# Patient Record
Sex: FEMALE | Race: WHITE | Employment: OTHER | URBAN - METROPOLITAN AREA
[De-identification: names, ages, dates, MRNs, and addresses within clinical notes are randomized per-mention and may not be internally consistent; named-entity substitution may affect disease eponyms.]

---

## 2017-04-06 ENCOUNTER — HOSPITAL ENCOUNTER (OUTPATIENT)
Dept: LAB | Age: 62
Discharge: HOME | End: 2017-04-06

## 2017-04-06 PROCEDURE — 88305 TISSUE EXAM BY PATHOLOGIST: CPT | Performed by: INTERNAL MEDICINE

## 2017-11-29 ENCOUNTER — HOSPITAL ENCOUNTER (OUTPATIENT)
Dept: MAMMOGRAPHY | Age: 62
Discharge: HOME | End: 2017-11-29
Attending: OBSTETRICS & GYNECOLOGY
Payer: COMMERCIAL

## 2017-11-29 DIAGNOSIS — Z12.31 VISIT FOR SCREENING MAMMOGRAM: ICD-10-CM

## 2017-11-29 PROCEDURE — 77067 SCR MAMMO BI INCL CAD: CPT

## 2018-11-30 ENCOUNTER — HOSPITAL ENCOUNTER (OUTPATIENT)
Dept: MAMMOGRAPHY | Age: 63
Discharge: HOME | End: 2018-11-30
Attending: INTERNAL MEDICINE
Payer: MEDICARE

## 2018-11-30 DIAGNOSIS — Z12.31 VISIT FOR SCREENING MAMMOGRAM: ICD-10-CM

## 2018-11-30 PROCEDURE — 77067 SCR MAMMO BI INCL CAD: CPT

## 2020-02-06 ENCOUNTER — HOSPITAL ENCOUNTER (OUTPATIENT)
Dept: MAMMOGRAPHY | Age: 65
Discharge: HOME | End: 2020-02-06
Attending: INTERNAL MEDICINE
Payer: MEDICARE

## 2020-02-06 DIAGNOSIS — Z12.31 VISIT FOR SCREENING MAMMOGRAM: ICD-10-CM

## 2020-02-06 PROCEDURE — 77067 SCR MAMMO BI INCL CAD: CPT

## 2021-09-25 ENCOUNTER — HOSPITAL ENCOUNTER (OUTPATIENT)
Dept: LAB | Age: 66
Discharge: HOME | End: 2021-09-25

## 2021-09-25 LAB
ANION GAP SERPL CALC-SCNC: 8 MMOL/L (ref 7–16)
BUN SERPL-MCNC: 42 MG/DL (ref 8–23)
CALCIUM SERPL-MCNC: 8.8 MG/DL (ref 8.3–10.4)
CHLORIDE SERPL-SCNC: 110 MMOL/L (ref 98–107)
CO2 SERPL-SCNC: 24 MMOL/L (ref 21–32)
CREAT SERPL-MCNC: 1.61 MG/DL (ref 0.6–1)
GLUCOSE SERPL-MCNC: 76 MG/DL (ref 65–100)
POTASSIUM SERPL-SCNC: 4.7 MMOL/L (ref 3.5–5.1)
SODIUM SERPL-SCNC: 142 MMOL/L (ref 136–145)
VALPROATE SERPL-MCNC: 4 UG/ML (ref 50–100)

## 2021-09-25 PROCEDURE — 80048 BASIC METABOLIC PNL TOTAL CA: CPT

## 2021-09-25 PROCEDURE — 80164 ASSAY DIPROPYLACETIC ACD TOT: CPT

## 2022-02-25 PROBLEM — M25.512 LEFT SHOULDER PAIN: Status: ACTIVE | Noted: 2022-02-25

## 2022-03-19 PROBLEM — M25.512 LEFT SHOULDER PAIN: Status: ACTIVE | Noted: 2022-02-25

## 2022-04-13 PROBLEM — G72.81 CRITICAL ILLNESS MYOPATHY: Status: ACTIVE | Noted: 2021-08-26

## 2022-04-13 PROBLEM — R41.82 ALTERED MENTAL STATUS: Status: ACTIVE | Noted: 2021-07-25

## 2022-04-13 PROBLEM — D69.3 ACUTE ITP (HCC): Status: ACTIVE | Noted: 2019-02-01

## 2022-04-13 PROBLEM — R20.2 HEMIPARESTHESIA: Status: ACTIVE | Noted: 2021-02-22

## 2022-04-13 PROBLEM — J96.90 RESPIRATORY FAILURE (HCC): Status: ACTIVE | Noted: 2021-08-26

## 2022-04-13 PROBLEM — Z94.0 KIDNEY TRANSPLANTED: Status: ACTIVE | Noted: 2019-06-24

## 2022-04-13 PROBLEM — G81.94 LEFT HEMIPARESIS (HCC): Status: ACTIVE | Noted: 2021-04-02

## 2022-04-13 PROBLEM — R06.03 ACUTE RESPIRATORY DISTRESS: Status: ACTIVE | Noted: 2021-07-28

## 2022-04-13 PROBLEM — I95.9 HYPOTENSION: Status: ACTIVE | Noted: 2021-03-22

## 2022-04-13 PROBLEM — I10 HYPERTENSION: Status: ACTIVE | Noted: 2021-08-26

## 2022-04-13 PROBLEM — N17.9 AKI (ACUTE KIDNEY INJURY) (HCC): Status: ACTIVE | Noted: 2021-08-26

## 2022-04-13 PROBLEM — G93.6 VASOGENIC EDEMA (HCC): Status: ACTIVE | Noted: 2021-02-22

## 2022-04-13 PROBLEM — D69.6 THROMBOCYTOPENIA (HCC): Status: ACTIVE | Noted: 2019-09-24

## 2022-04-13 PROBLEM — E87.20 METABOLIC ACIDOSIS: Status: ACTIVE | Noted: 2021-07-28

## 2022-04-13 PROBLEM — I48.0 PAROXYSMAL ATRIAL FIBRILLATION (HCC): Status: ACTIVE | Noted: 2022-01-06

## 2022-04-13 PROBLEM — E87.0 HYPERNATREMIA: Status: ACTIVE | Noted: 2021-07-28

## 2022-04-13 PROBLEM — D84.9 IMMUNOSUPPRESSED STATUS (HCC): Status: ACTIVE | Noted: 2021-02-22

## 2022-04-13 PROBLEM — L89.150 PRESSURE INJURY OF SACRAL REGION, UNSTAGEABLE (HCC): Status: ACTIVE | Noted: 2021-08-26

## 2022-04-13 PROBLEM — J96.01 ACUTE RESPIRATORY FAILURE WITH HYPOXIA (HCC): Status: ACTIVE | Noted: 2021-08-26

## 2022-04-13 PROBLEM — E11.42 DIABETIC POLYNEUROPATHY ASSOCIATED WITH TYPE 2 DIABETES MELLITUS (HCC): Status: ACTIVE | Noted: 2021-03-05

## 2022-04-13 PROBLEM — R53.81 DEBILITY: Status: ACTIVE | Noted: 2021-08-26

## 2022-04-13 PROBLEM — B45.1 CRYPTOCOCCAL MENINGITIS (HCC): Status: ACTIVE | Noted: 2021-03-04

## 2022-04-13 PROBLEM — N18.32 STAGE 3B CHRONIC KIDNEY DISEASE (HCC): Status: ACTIVE | Noted: 2021-08-26

## 2022-04-13 PROBLEM — D64.9 ANEMIA: Status: ACTIVE | Noted: 2021-01-05

## 2023-02-15 ENCOUNTER — HOSPITAL ENCOUNTER (OUTPATIENT)
Dept: MAMMOGRAPHY | Age: 68
Discharge: HOME | End: 2023-02-18
Payer: MEDICARE

## 2023-02-15 DIAGNOSIS — Z12.31 VISIT FOR SCREENING MAMMOGRAM: ICD-10-CM

## 2023-02-15 PROCEDURE — 77067 SCR MAMMO BI INCL CAD: CPT

## 2024-01-22 ENCOUNTER — TRANSCRIBE ORDERS (OUTPATIENT)
Dept: SCHEDULING | Age: 69
End: 2024-01-22

## 2024-01-22 DIAGNOSIS — Z12.31 ENCOUNTER FOR SCREENING MAMMOGRAM FOR BREAST CANCER: Primary | ICD-10-CM

## 2024-05-22 ENCOUNTER — OFFICE VISIT (OUTPATIENT)
Dept: OBGYN CLINIC | Age: 69
End: 2024-05-22
Payer: MEDICARE

## 2024-05-22 VITALS — DIASTOLIC BLOOD PRESSURE: 64 MMHG | WEIGHT: 179.4 LBS | SYSTOLIC BLOOD PRESSURE: 116 MMHG

## 2024-05-22 DIAGNOSIS — Z12.31 VISIT FOR SCREENING MAMMOGRAM: ICD-10-CM

## 2024-05-22 DIAGNOSIS — Z01.419 WELL WOMAN EXAM: Primary | ICD-10-CM

## 2024-05-22 PROCEDURE — G8427 DOCREV CUR MEDS BY ELIG CLIN: HCPCS | Performed by: NURSE PRACTITIONER

## 2024-05-22 PROCEDURE — G8421 BMI NOT CALCULATED: HCPCS | Performed by: NURSE PRACTITIONER

## 2024-05-22 PROCEDURE — G0101 CA SCREEN;PELVIC/BREAST EXAM: HCPCS | Performed by: NURSE PRACTITIONER

## 2024-05-22 RX ORDER — FUROSEMIDE 80 MG
80 TABLET ORAL DAILY
COMMUNITY

## 2024-05-22 RX ORDER — LISINOPRIL 20 MG/1
20 TABLET ORAL DAILY
COMMUNITY

## 2024-05-22 RX ORDER — SODIUM BICARBONATE 650 MG/1
650 TABLET ORAL 2 TIMES DAILY
COMMUNITY

## 2024-05-22 RX ORDER — FLUCONAZOLE 100 MG/1
100 TABLET ORAL DAILY
COMMUNITY

## 2024-05-22 RX ORDER — LEVOTHYROXINE SODIUM 0.03 MG/1
25 TABLET ORAL DAILY
COMMUNITY

## 2024-05-22 RX ORDER — MONTELUKAST SODIUM 4 MG/1
1 TABLET, CHEWABLE ORAL DAILY
COMMUNITY

## 2024-05-22 RX ORDER — AZATHIOPRINE 50 MG/1
75 TABLET ORAL DAILY
COMMUNITY

## 2024-05-22 RX ORDER — METOPROLOL SUCCINATE 50 MG/1
50 TABLET, EXTENDED RELEASE ORAL DAILY
COMMUNITY

## 2024-05-22 RX ORDER — AMIODARONE HYDROCHLORIDE 200 MG/1
100 TABLET ORAL DAILY
COMMUNITY

## 2024-05-22 RX ORDER — PRIMIDONE 50 MG/1
50 TABLET ORAL 3 TIMES DAILY
COMMUNITY

## 2024-05-22 RX ORDER — DOXAZOSIN MESYLATE 4 MG/1
4 TABLET ORAL NIGHTLY
COMMUNITY

## 2024-05-22 RX ORDER — PRAMIPEXOLE DIHYDROCHLORIDE 0.25 MG/1
0.25 TABLET ORAL DAILY
COMMUNITY

## 2024-05-22 RX ORDER — INSULIN GLARGINE 100 [IU]/ML
INJECTION, SOLUTION SUBCUTANEOUS NIGHTLY
COMMUNITY

## 2024-05-22 RX ORDER — LANOLIN ALCOHOL/MO/W.PET/CERES
800 CREAM (GRAM) TOPICAL DAILY
COMMUNITY

## 2024-05-22 NOTE — PROGRESS NOTES
hydrALAZINE (APRESOLINE) 100 MG tablet Take 1 tablet by mouth daily      insulin lispro, 1 Unit Dial, (HUMALOG KWIKPEN) 100 UNIT/ML SOPN 10 units at lunch time and 8 units at bedtime      losartan (COZAAR) 50 MG tablet TAKE 1 TABLET BY MOUTH EVERY DAY      ondansetron (ZOFRAN-ODT) 4 MG disintegrating tablet Place 1 tablet under the tongue every 6 hours as needed      pravastatin (PRAVACHOL) 40 MG tablet Take 1 tablet by mouth daily      predniSONE (DELTASONE) 5 MG tablet Take 1 tablet by mouth daily       No current facility-administered medications for this visit.       Social History:  Social History     Socioeconomic History    Marital status:      Spouse name: Not on file    Number of children: Not on file    Years of education: Not on file    Highest education level: Not on file   Occupational History    Not on file   Tobacco Use    Smoking status: Never    Smokeless tobacco: Never   Vaping Use    Vaping Use: Never used   Substance and Sexual Activity    Alcohol use: Not Currently    Drug use: No    Sexual activity: Not on file   Other Topics Concern    Not on file   Social History Narrative    Father is Austin Younger     Social Determinants of Health     Financial Resource Strain: Not on file   Food Insecurity: Not on file   Transportation Needs: Not on file   Physical Activity: Not on file   Stress: Not on file   Social Connections: Not on file   Intimate Partner Violence: Not on file   Housing Stability: Not on file       Family History:  Family History   Problem Relation Age of Onset    Other Mother         inabilty to oxygenate after surgery    Post-op Nausea/Vomiting Mother     Hypertension Mother     Diabetes Father     Hypertension Maternal Grandmother     Breast Cancer Neg Hx        Review of Systems - General ROS: negative except for that discussed in HPI      ROS:  Feeling well. No dyspnea or chest pain on exertion.  No abdominal pain, change in bowel habits, black or bloody stools.  No urinary

## 2024-05-24 ENCOUNTER — HOSPITAL ENCOUNTER (OUTPATIENT)
Dept: MAMMOGRAPHY | Age: 69
End: 2024-05-24
Payer: MEDICARE

## 2024-05-24 VITALS — WEIGHT: 177 LBS | BODY MASS INDEX: 30.22 KG/M2 | HEIGHT: 64 IN

## 2024-05-24 DIAGNOSIS — Z12.31 ENCOUNTER FOR SCREENING MAMMOGRAM FOR BREAST CANCER: ICD-10-CM

## 2024-05-24 PROCEDURE — 77067 SCR MAMMO BI INCL CAD: CPT

## 2025-08-17 ENCOUNTER — HOSPITAL ENCOUNTER (EMERGENCY)
Age: 70
Discharge: HOME | End: 2025-08-17
Payer: MEDICARE

## 2025-08-17 ENCOUNTER — APPOINTMENT (OUTPATIENT)
Dept: CT IMAGING | Age: 70
End: 2025-08-17
Payer: MEDICARE

## 2025-08-17 ENCOUNTER — APPOINTMENT (OUTPATIENT)
Dept: GENERAL RADIOLOGY | Age: 70
End: 2025-08-17
Payer: MEDICARE

## 2025-08-17 VITALS
DIASTOLIC BLOOD PRESSURE: 76 MMHG | RESPIRATION RATE: 16 BRPM | OXYGEN SATURATION: 96 % | TEMPERATURE: 98.2 F | SYSTOLIC BLOOD PRESSURE: 173 MMHG | BODY MASS INDEX: 27.32 KG/M2 | WEIGHT: 159.17 LBS | HEART RATE: 63 BPM

## 2025-08-17 DIAGNOSIS — S81.812A LACERATION OF LEFT LOWER EXTREMITY, INITIAL ENCOUNTER: Primary | ICD-10-CM

## 2025-08-17 DIAGNOSIS — S41.112A SKIN TEAR OF LEFT UPPER EXTREMITY: ICD-10-CM

## 2025-08-17 PROBLEM — M54.32 BILATERAL SCIATICA: Status: ACTIVE | Noted: 2024-11-22

## 2025-08-17 PROBLEM — S40.811A ABRASION OF RIGHT UPPER EXTREMITY: Status: ACTIVE | Noted: 2022-09-14

## 2025-08-17 PROBLEM — R33.9 RETENTION OF URINE: Status: ACTIVE | Noted: 2022-07-01

## 2025-08-17 PROBLEM — M54.31 BILATERAL SCIATICA: Status: ACTIVE | Noted: 2024-11-22

## 2025-08-17 PROBLEM — D63.8 ANEMIA OF CHRONIC DISEASE: Status: ACTIVE | Noted: 2025-06-18

## 2025-08-17 PROBLEM — Z79.899 POLYPHARMACY: Status: ACTIVE | Noted: 2024-03-04

## 2025-08-17 PROBLEM — Z86.19 HISTORY OF CRYPTOCOCCAL MENINGITIS: Status: ACTIVE | Noted: 2025-03-28

## 2025-08-17 PROBLEM — K59.00 CONSTIPATION: Status: ACTIVE | Noted: 2025-04-29

## 2025-08-17 PROBLEM — I27.20 PULMONARY HYPERTENSION (HCC): Status: ACTIVE | Noted: 2022-09-29

## 2025-08-17 PROBLEM — Z98.890 POSTOPERATIVE HYPOXIA: Status: ACTIVE | Noted: 2025-02-04

## 2025-08-17 PROBLEM — T85.611A PERITONEAL DIALYSIS CATHETER DYSFUNCTION: Status: ACTIVE | Noted: 2025-05-31

## 2025-08-17 PROBLEM — R29.898 WEAKNESS OF EXTREMITY: Status: ACTIVE | Noted: 2022-09-29

## 2025-08-17 PROBLEM — Z79.899 HIGH RISK MEDICATION USE: Status: ACTIVE | Noted: 2021-02-22

## 2025-08-17 PROBLEM — D50.9 IRON DEFICIENCY ANEMIA: Status: ACTIVE | Noted: 2022-07-01

## 2025-08-17 PROBLEM — E03.9 HYPOTHYROIDISM: Chronic | Status: ACTIVE | Noted: 2022-07-01

## 2025-08-17 PROBLEM — N18.6 END-STAGE RENAL DISEASE (HCC): Status: ACTIVE | Noted: 2025-06-18

## 2025-08-17 PROBLEM — Z99.2 TYPE 2 DIABETES MELLITUS WITH CHRONIC KIDNEY DISEASE ON CHRONIC DIALYSIS, WITH LONG-TERM CURRENT USE OF INSULIN (HCC): Status: ACTIVE | Noted: 2023-02-14

## 2025-08-17 PROBLEM — F33.9 RECURRENT MAJOR DEPRESSION: Status: ACTIVE | Noted: 2025-06-18

## 2025-08-17 PROBLEM — K92.1 HEMATOCHEZIA: Status: ACTIVE | Noted: 2025-06-14

## 2025-08-17 PROBLEM — F43.20 ADJUSTMENT REACTION: Status: ACTIVE | Noted: 2025-06-18

## 2025-08-17 PROBLEM — I73.9 PERIPHERAL VASCULAR DISEASE: Status: ACTIVE | Noted: 2025-06-18

## 2025-08-17 PROBLEM — R21 RASH: Status: ACTIVE | Noted: 2023-05-26

## 2025-08-17 PROBLEM — I67.9 CEREBROVASCULAR DISEASE: Status: ACTIVE | Noted: 2025-06-01

## 2025-08-17 PROBLEM — R60.0 LOWER EXTREMITY EDEMA: Status: ACTIVE | Noted: 2022-09-29

## 2025-08-17 PROBLEM — R10.33 PERIUMBILICAL ABDOMINAL PAIN: Status: ACTIVE | Noted: 2025-04-29

## 2025-08-17 PROBLEM — I87.2 PERIPHERAL VENOUS INSUFFICIENCY: Status: ACTIVE | Noted: 2025-06-04

## 2025-08-17 PROBLEM — B99.9: Status: ACTIVE | Noted: 2025-06-18

## 2025-08-17 PROBLEM — K92.1 MELENA: Status: ACTIVE | Noted: 2025-06-14

## 2025-08-17 PROBLEM — E11.22 TYPE 2 DIABETES MELLITUS WITH CHRONIC KIDNEY DISEASE ON CHRONIC DIALYSIS, WITH LONG-TERM CURRENT USE OF INSULIN (HCC): Status: ACTIVE | Noted: 2023-02-14

## 2025-08-17 PROBLEM — Z86.0100 HISTORY OF COLON POLYPS: Status: ACTIVE | Noted: 2024-05-13

## 2025-08-17 PROBLEM — N19 UREMIA: Status: ACTIVE | Noted: 2024-03-04

## 2025-08-17 PROBLEM — E87.70 VOLUME OVERLOAD: Status: ACTIVE | Noted: 2025-03-28

## 2025-08-17 PROBLEM — G47.33 OSA (OBSTRUCTIVE SLEEP APNEA): Chronic | Status: ACTIVE | Noted: 2023-02-14

## 2025-08-17 PROBLEM — F41.9 ANXIETY: Status: ACTIVE | Noted: 2023-02-14

## 2025-08-17 PROBLEM — R05.2 SUBACUTE COUGH: Status: ACTIVE | Noted: 2023-10-02

## 2025-08-17 PROBLEM — I87.2 STASIS DERMATITIS OF BOTH LEGS: Status: ACTIVE | Noted: 2025-06-04

## 2025-08-17 PROBLEM — G93.41 ACUTE METABOLIC ENCEPHALOPATHY: Status: ACTIVE | Noted: 2024-03-04

## 2025-08-17 PROBLEM — K92.2 GASTROINTESTINAL HEMORRHAGE: Status: ACTIVE | Noted: 2025-06-18

## 2025-08-17 PROBLEM — G96.89: Status: ACTIVE | Noted: 2025-06-18

## 2025-08-17 PROBLEM — B99.9 INFECTIOUS DISEASE: Status: ACTIVE | Noted: 2025-03-28

## 2025-08-17 PROBLEM — Z79.4 TYPE 2 DIABETES MELLITUS WITH CHRONIC KIDNEY DISEASE ON CHRONIC DIALYSIS, WITH LONG-TERM CURRENT USE OF INSULIN (HCC): Status: ACTIVE | Noted: 2023-02-14

## 2025-08-17 PROBLEM — Z99.2 ESRD ON DIALYSIS (HCC): Chronic | Status: ACTIVE | Noted: 2023-12-04

## 2025-08-17 PROBLEM — G20.A1 PARKINSONS (HCC): Status: ACTIVE | Noted: 2024-03-02

## 2025-08-17 PROBLEM — E87.5 HYPERKALEMIA: Status: ACTIVE | Noted: 2024-03-04

## 2025-08-17 PROBLEM — G25.0 ESSENTIAL TREMOR: Chronic | Status: ACTIVE | Noted: 2024-10-02

## 2025-08-17 PROBLEM — R53.1 WEAKNESS: Status: ACTIVE | Noted: 2022-06-03

## 2025-08-17 PROBLEM — N18.6 ESRD ON DIALYSIS (HCC): Chronic | Status: ACTIVE | Noted: 2023-12-04

## 2025-08-17 PROBLEM — N18.6 TYPE 2 DIABETES MELLITUS WITH CHRONIC KIDNEY DISEASE ON CHRONIC DIALYSIS, WITH LONG-TERM CURRENT USE OF INSULIN (HCC): Status: ACTIVE | Noted: 2023-02-14

## 2025-08-17 PROBLEM — K42.9 UMBILICAL HERNIA WITHOUT OBSTRUCTION AND WITHOUT GANGRENE: Status: ACTIVE | Noted: 2025-01-08

## 2025-08-17 PROBLEM — G25.81 RESTLESS LEGS SYNDROME: Status: ACTIVE | Noted: 2024-10-14

## 2025-08-17 PROBLEM — R19.7 WATERY DIARRHEA: Status: ACTIVE | Noted: 2022-03-03

## 2025-08-17 PROBLEM — R35.0 URINARY FREQUENCY: Status: ACTIVE | Noted: 2024-02-19

## 2025-08-17 PROBLEM — M79.604 BILATERAL LEG PAIN: Status: ACTIVE | Noted: 2025-06-09

## 2025-08-17 PROBLEM — Z94.0 HISTORY OF KIDNEY TRANSPLANT: Status: ACTIVE | Noted: 2019-06-24

## 2025-08-17 PROBLEM — G47.19 EXCESSIVE DAYTIME SLEEPINESS: Status: ACTIVE | Noted: 2022-11-28

## 2025-08-17 PROBLEM — B45.9 CRYPTOCOCCUS (HCC): Chronic | Status: ACTIVE | Noted: 2025-06-01

## 2025-08-17 PROBLEM — M79.604 PAIN OF RIGHT LOWER EXTREMITY: Status: ACTIVE | Noted: 2025-06-09

## 2025-08-17 PROBLEM — D53.9 MACROCYTIC ANEMIA: Status: ACTIVE | Noted: 2024-02-19

## 2025-08-17 PROBLEM — I63.9 CVA (CEREBRAL VASCULAR ACCIDENT) (HCC): Chronic | Status: ACTIVE | Noted: 2025-04-23

## 2025-08-17 PROBLEM — R09.02 POSTOPERATIVE HYPOXIA: Status: ACTIVE | Noted: 2025-02-04

## 2025-08-17 PROBLEM — E11.8 COMPLICATION OF DIABETES MELLITUS (HCC): Status: ACTIVE | Noted: 2025-06-18

## 2025-08-17 PROBLEM — R32 URINARY INCONTINENCE: Status: ACTIVE | Noted: 2023-10-02

## 2025-08-17 PROBLEM — M81.0 OSTEOPOROSIS: Status: ACTIVE | Noted: 2025-03-28

## 2025-08-17 PROBLEM — I89.0 SECONDARY LYMPHEDEMA: Status: ACTIVE | Noted: 2024-11-04

## 2025-08-17 PROBLEM — M79.605 BILATERAL LEG PAIN: Status: ACTIVE | Noted: 2025-06-09

## 2025-08-17 PROBLEM — D61.818 PANCYTOPENIA (HCC): Status: ACTIVE | Noted: 2021-01-05

## 2025-08-17 PROBLEM — R65.10 SYSTEMIC INFLAMMATORY RESPONSE SYNDROME (SIRS) DUE TO NON-INFECTIOUS PROCESS WITHOUT ACUTE ORGAN DYSFUNCTION (HCC): Status: ACTIVE | Noted: 2025-06-14

## 2025-08-17 PROBLEM — N18.5 CHRONIC KIDNEY DISEASE, STAGE V (HCC): Status: ACTIVE | Noted: 2024-01-03

## 2025-08-17 PROBLEM — R65.10 SIRS (SYSTEMIC INFLAMMATORY RESPONSE SYNDROME) (HCC): Status: ACTIVE | Noted: 2025-06-14

## 2025-08-17 PROBLEM — R94.31 PROLONGED QT INTERVAL: Status: ACTIVE | Noted: 2025-06-13

## 2025-08-17 PROBLEM — Z86.73 HISTORY OF CVA (CEREBROVASCULAR ACCIDENT): Status: ACTIVE | Noted: 2025-06-01

## 2025-08-17 PROBLEM — Z94.0: Status: ACTIVE | Noted: 2025-06-18

## 2025-08-17 PROBLEM — A04.72 C. DIFFICILE DIARRHEA: Status: ACTIVE | Noted: 2024-05-13

## 2025-08-17 PROBLEM — R25.2 SPASM: Status: ACTIVE | Noted: 2025-06-18

## 2025-08-17 PROBLEM — M62.81 MUSCLE WEAKNESS: Status: ACTIVE | Noted: 2025-06-18

## 2025-08-17 PROBLEM — R53.81 MALAISE: Status: ACTIVE | Noted: 2025-06-18

## 2025-08-17 PROBLEM — M81.0 AGE-RELATED OSTEOPOROSIS WITHOUT CURRENT PATHOLOGICAL FRACTURE: Status: ACTIVE | Noted: 2025-03-28

## 2025-08-17 PROBLEM — K43.9 VENTRAL HERNIA: Status: ACTIVE | Noted: 2025-08-13

## 2025-08-17 PROBLEM — J18.9 PNEUMONIA OF RIGHT LOWER LOBE DUE TO INFECTIOUS ORGANISM: Status: ACTIVE | Noted: 2025-05-31

## 2025-08-17 PROCEDURE — 6370000000 HC RX 637 (ALT 250 FOR IP)

## 2025-08-17 PROCEDURE — 70450 CT HEAD/BRAIN W/O DYE: CPT

## 2025-08-17 PROCEDURE — 73521 X-RAY EXAM HIPS BI 2 VIEWS: CPT

## 2025-08-17 PROCEDURE — 12034 INTMD RPR S/TR/EXT 7.6-12.5: CPT

## 2025-08-17 PROCEDURE — 73590 X-RAY EXAM OF LOWER LEG: CPT

## 2025-08-17 PROCEDURE — 99284 EMERGENCY DEPT VISIT MOD MDM: CPT

## 2025-08-17 RX ORDER — DIAZEPAM 5 MG/1
5 TABLET ORAL ONCE
Status: COMPLETED | OUTPATIENT
Start: 2025-08-17 | End: 2025-08-17

## 2025-08-17 RX ORDER — FLUCONAZOLE 150 MG/1
150 TABLET ORAL ONCE
Qty: 1 TABLET | Refills: 0 | Status: SHIPPED | OUTPATIENT
Start: 2025-08-17 | End: 2025-08-17

## 2025-08-17 RX ADMIN — DIAZEPAM 5 MG: 5 TABLET ORAL at 15:35

## 2025-08-17 ASSESSMENT — LIFESTYLE VARIABLES
HOW OFTEN DO YOU HAVE A DRINK CONTAINING ALCOHOL: NEVER
HOW MANY STANDARD DRINKS CONTAINING ALCOHOL DO YOU HAVE ON A TYPICAL DAY: PATIENT DOES NOT DRINK

## 2025-08-17 ASSESSMENT — PAIN DESCRIPTION - LOCATION: LOCATION: LEG

## 2025-08-17 ASSESSMENT — PAIN SCALES - GENERAL
PAINLEVEL_OUTOF10: 10
PAINLEVEL_OUTOF10: 0

## 2025-08-17 ASSESSMENT — PAIN DESCRIPTION - ORIENTATION: ORIENTATION: LEFT;LOWER;OUTER

## 2025-08-17 ASSESSMENT — PAIN - FUNCTIONAL ASSESSMENT
PAIN_FUNCTIONAL_ASSESSMENT: 0-10
PAIN_FUNCTIONAL_ASSESSMENT: 0-10